# Patient Record
Sex: FEMALE | Race: WHITE | NOT HISPANIC OR LATINO | URBAN - METROPOLITAN AREA
[De-identification: names, ages, dates, MRNs, and addresses within clinical notes are randomized per-mention and may not be internally consistent; named-entity substitution may affect disease eponyms.]

---

## 2017-08-10 ENCOUNTER — FOLLOW UP ESTABLISHED (OUTPATIENT)
Dept: URBAN - METROPOLITAN AREA CLINIC 60 | Facility: CLINIC | Age: 82
End: 2017-08-10
Payer: MEDICARE

## 2017-08-10 DIAGNOSIS — H25.812 COMBINED FORMS OF AGE-RELATED CATARACT, LEFT EYE: Primary | ICD-10-CM

## 2017-08-10 DIAGNOSIS — H52.4 PRESBYOPIA: ICD-10-CM

## 2017-08-10 PROCEDURE — 92015 DETERMINE REFRACTIVE STATE: CPT | Performed by: OPTOMETRIST

## 2017-08-10 PROCEDURE — 92014 COMPRE OPH EXAM EST PT 1/>: CPT | Performed by: OPTOMETRIST

## 2017-08-10 ASSESSMENT — INTRAOCULAR PRESSURE
OD: 12
OS: 12

## 2017-08-10 ASSESSMENT — VISUAL ACUITY
OS: 20/50
OD: 20/40

## 2017-10-09 ENCOUNTER — NEW PATIENT (OUTPATIENT)
Dept: URBAN - METROPOLITAN AREA CLINIC 60 | Facility: CLINIC | Age: 82
End: 2017-10-09
Payer: MEDICARE

## 2017-10-09 DIAGNOSIS — H04.123 DRY EYE SYNDROME OF BILATERAL LACRIMAL GLANDS: ICD-10-CM

## 2017-10-09 DIAGNOSIS — H35.3131 NONEXUDATIVE MACULAR DEGENERATION, EARLY DRY STAGE, BILATERAL: ICD-10-CM

## 2017-10-09 DIAGNOSIS — H26.491 OTHER SECONDARY CATARACT, RIGHT EYE: ICD-10-CM

## 2017-10-09 PROCEDURE — 92004 COMPRE OPH EXAM NEW PT 1/>: CPT | Performed by: OPHTHALMOLOGY

## 2017-10-09 RX ORDER — OMEGA-3S/DHA/EPA/FISH OIL/D3 1150-1000
LIQUID (ML) ORAL
Qty: 0 | Refills: 0 | Status: ACTIVE
Start: 2017-10-09

## 2017-10-09 ASSESSMENT — VISUAL ACUITY
OS: 20/50
OD: 20/40

## 2017-10-09 ASSESSMENT — INTRAOCULAR PRESSURE
OS: 14
OD: 14

## 2017-10-17 ENCOUNTER — Encounter (OUTPATIENT)
Dept: URBAN - METROPOLITAN AREA CLINIC 60 | Facility: CLINIC | Age: 82
End: 2017-10-17
Payer: MEDICARE

## 2017-10-17 DIAGNOSIS — Z01.818 ENCOUNTER FOR OTHER PREPROCEDURAL EXAMINATION: Primary | ICD-10-CM

## 2017-10-17 PROCEDURE — 99213 OFFICE O/P EST LOW 20 MIN: CPT | Performed by: NURSE PRACTITIONER

## 2017-10-31 ENCOUNTER — SURGERY (OUTPATIENT)
Dept: URBAN - METROPOLITAN AREA SURGERY 36 | Facility: SURGERY | Age: 82
End: 2017-10-31
Payer: MEDICARE

## 2017-10-31 PROCEDURE — 66984 XCAPSL CTRC RMVL W/O ECP: CPT | Performed by: OPHTHALMOLOGY

## 2017-11-01 ENCOUNTER — POST OP (OUTPATIENT)
Dept: URBAN - METROPOLITAN AREA CLINIC 60 | Facility: CLINIC | Age: 82
End: 2017-11-01
Payer: MEDICARE

## 2017-11-01 PROCEDURE — 99024 POSTOP FOLLOW-UP VISIT: CPT | Performed by: OPTOMETRIST

## 2017-11-01 ASSESSMENT — INTRAOCULAR PRESSURE: OS: 16

## 2017-11-01 ASSESSMENT — VISUAL ACUITY: OD: 20/40

## 2017-11-08 ENCOUNTER — POST OP (OUTPATIENT)
Dept: URBAN - METROPOLITAN AREA CLINIC 60 | Facility: CLINIC | Age: 82
End: 2017-11-08
Payer: MEDICARE

## 2017-11-08 PROCEDURE — 99024 POSTOP FOLLOW-UP VISIT: CPT | Performed by: OPTOMETRIST

## 2017-11-08 ASSESSMENT — VISUAL ACUITY
OS: 20/25
OD: 20/25

## 2017-11-08 ASSESSMENT — INTRAOCULAR PRESSURE
OD: 14
OS: 15

## 2017-12-08 ENCOUNTER — POST OP (OUTPATIENT)
Dept: URBAN - METROPOLITAN AREA CLINIC 60 | Facility: CLINIC | Age: 82
End: 2017-12-08
Payer: MEDICARE

## 2017-12-08 PROCEDURE — 92015 DETERMINE REFRACTIVE STATE: CPT | Performed by: OPTOMETRIST

## 2017-12-08 PROCEDURE — 99024 POSTOP FOLLOW-UP VISIT: CPT | Performed by: OPTOMETRIST

## 2017-12-08 ASSESSMENT — INTRAOCULAR PRESSURE
OS: 13
OD: 13

## 2017-12-08 ASSESSMENT — VISUAL ACUITY
OD: 20/30
OS: 20/30

## 2018-12-13 ENCOUNTER — FOLLOW UP ESTABLISHED (OUTPATIENT)
Dept: URBAN - METROPOLITAN AREA CLINIC 60 | Facility: CLINIC | Age: 83
End: 2018-12-13
Payer: COMMERCIAL

## 2018-12-13 PROCEDURE — 92014 COMPRE OPH EXAM EST PT 1/>: CPT | Performed by: OPTOMETRIST

## 2018-12-13 ASSESSMENT — VISUAL ACUITY
OD: 20/25
OS: 20/25

## 2018-12-13 ASSESSMENT — INTRAOCULAR PRESSURE
OS: 14
OD: 13

## 2020-01-21 ENCOUNTER — FOLLOW UP ESTABLISHED (OUTPATIENT)
Dept: URBAN - METROPOLITAN AREA CLINIC 60 | Facility: CLINIC | Age: 85
End: 2020-01-21
Payer: COMMERCIAL

## 2020-01-21 DIAGNOSIS — H26.493 OTHER SECONDARY CATARACT, BILATERAL: ICD-10-CM

## 2020-01-21 PROCEDURE — 92250 FUNDUS PHOTOGRAPHY W/I&R: CPT | Performed by: OPTOMETRIST

## 2020-01-21 PROCEDURE — 92014 COMPRE OPH EXAM EST PT 1/>: CPT | Performed by: OPTOMETRIST

## 2020-01-21 ASSESSMENT — INTRAOCULAR PRESSURE
OS: 11
OD: 11

## 2021-01-27 ENCOUNTER — FOLLOW UP ESTABLISHED (OUTPATIENT)
Dept: URBAN - METROPOLITAN AREA CLINIC 60 | Facility: CLINIC | Age: 86
End: 2021-01-27
Payer: COMMERCIAL

## 2021-01-27 DIAGNOSIS — H43.813 VITREOUS DEGENERATION, BILATERAL: ICD-10-CM

## 2021-01-27 DIAGNOSIS — H35.3132 NONEXUDATIVE MACULAR DEGENERATION, INTERMEDIATE DRY STAGE, BILATERAL: Primary | ICD-10-CM

## 2021-01-27 PROCEDURE — 92014 COMPRE OPH EXAM EST PT 1/>: CPT | Performed by: OPTOMETRIST

## 2021-01-27 PROCEDURE — 92250 FUNDUS PHOTOGRAPHY W/I&R: CPT | Performed by: OPTOMETRIST

## 2021-01-27 RX ORDER — VIT C/E/ZN/COPPR/LUTEIN/ZEAXAN 250MG-90MG
CAPSULE ORAL
Qty: 0 | Refills: 0 | Status: ACTIVE
Start: 2021-01-27

## 2021-01-27 ASSESSMENT — INTRAOCULAR PRESSURE
OD: 14
OS: 12

## 2021-01-27 ASSESSMENT — VISUAL ACUITY
OS: 20/40
OD: 20/30

## 2021-11-09 ENCOUNTER — OFFICE VISIT (OUTPATIENT)
Dept: URBAN - METROPOLITAN AREA CLINIC 60 | Facility: CLINIC | Age: 86
End: 2021-11-09
Payer: COMMERCIAL

## 2021-11-09 DIAGNOSIS — H00.012 HORDEOLUM EXTERNUM RIGHT LOWER EYELID: Primary | ICD-10-CM

## 2021-11-09 PROCEDURE — 92012 INTRM OPH EXAM EST PATIENT: CPT | Performed by: OPTOMETRIST

## 2021-11-09 RX ORDER — CEPHALEXIN 500 MG/1
500 MG CAPSULE ORAL
Qty: 21 | Refills: 0 | Status: ACTIVE
Start: 2021-11-09

## 2021-11-09 NOTE — IMPRESSION/PLAN
Impression: Hordeolum externum right lower eyelid: H00.012. Plan: Patient educated on findings. Recommend patient perform  warm compresses. Will start patient on Cephalexin TID PO (500mg, 21 capsules), erx'd to patient's pharmacy.  If symptoms should worsen or do not improve, patient to call office and will scheduled with Dr. Johan Lopez for possible removal.

## 2022-02-02 ENCOUNTER — OFFICE VISIT (OUTPATIENT)
Dept: URBAN - METROPOLITAN AREA CLINIC 60 | Facility: CLINIC | Age: 87
End: 2022-02-02
Payer: COMMERCIAL

## 2022-02-02 DIAGNOSIS — H16.223 KERATOCONJUNCTIVITIS SICCA, BILATERAL: ICD-10-CM

## 2022-02-02 DIAGNOSIS — H35.3121 NEXDTVE AGE-RELATED MCLR DEGN, LEFT EYE, EARLY DRY STAGE: ICD-10-CM

## 2022-02-02 DIAGNOSIS — H35.3112 NEXDTVE AGE-RELATED MCLR DEGN, RIGHT EYE, INTERMED DRY STAGE: Primary | ICD-10-CM

## 2022-02-02 DIAGNOSIS — Z96.1 PRESENCE OF INTRAOCULAR LENS: ICD-10-CM

## 2022-02-02 PROCEDURE — 92014 COMPRE OPH EXAM EST PT 1/>: CPT | Performed by: OPTOMETRIST

## 2022-02-02 PROCEDURE — 92134 CPTRZ OPH DX IMG PST SGM RTA: CPT | Performed by: OPTOMETRIST

## 2022-02-02 ASSESSMENT — VISUAL ACUITY
OD: 20/40
OS: 20/30

## 2022-02-02 ASSESSMENT — INTRAOCULAR PRESSURE
OS: 15
OD: 15

## 2022-02-02 NOTE — IMPRESSION/PLAN
Impression: Keratoconjunctivitis sicca, bilateral: K90.483. Plan: Diagnosis discussed with patient. Recommend patient use artificial tears as needed.

## 2022-02-02 NOTE — IMPRESSION/PLAN
Impression: Nexdtve age-related mclr degn, right eye, intermed dry stage: H35.3112. Plan: Patient was educated on today's findings and recommend yearly exams. Reviewed last testing done. Baseline OCT(MAC) done today to document any changes for future visits. Recommend patient continue taking an AREDS-2 formula multiple vitamin daily.

## 2023-02-02 ENCOUNTER — OFFICE VISIT (OUTPATIENT)
Dept: URBAN - METROPOLITAN AREA CLINIC 60 | Facility: CLINIC | Age: 88
End: 2023-02-02
Payer: COMMERCIAL

## 2023-02-02 DIAGNOSIS — H35.3121 NEXDTVE AGE-RELATED MCLR DEGN, LEFT EYE, EARLY DRY STAGE: ICD-10-CM

## 2023-02-02 DIAGNOSIS — H40.013 OPEN ANGLE WITH BORDERLINE FINDINGS, LOW RISK, BILATERAL: ICD-10-CM

## 2023-02-02 DIAGNOSIS — H35.3112 NEXDTVE AGE-RELATED MCLR DEGN, RIGHT EYE, INTERMED DRY STAGE: Primary | ICD-10-CM

## 2023-02-02 DIAGNOSIS — Z96.1 PRESENCE OF INTRAOCULAR LENS: ICD-10-CM

## 2023-02-02 PROCEDURE — 92133 CPTRZD OPH DX IMG PST SGM ON: CPT | Performed by: OPTOMETRIST

## 2023-02-02 PROCEDURE — 92014 COMPRE OPH EXAM EST PT 1/>: CPT | Performed by: OPTOMETRIST

## 2023-02-02 ASSESSMENT — INTRAOCULAR PRESSURE
OD: 13
OS: 14

## 2024-04-26 NOTE — IMPRESSION/PLAN
Impression: Nexdtve age-related mclr degn, left eye, early dry stage: H35.3121. Plan: See plan #1.
Impression: Nexdtve age-related mclr degn, right eye, intermed dry stage: H35.3112. Plan: Patient was educated on today's findings and recommend yearly exams. Reviewed last testing done. Recommend patient continue taking an AREDS-2 formula multiple vitamin daily.
Impression: Open angle with borderline findings, low risk, bilateral: H40.013. *Secondary to drance heme OS* Plan: Patient educated on findings. Baseline OCT(RNFL) done today to document any changes for future visits. No need for drop treatment at this time.
Impression: Presence of intraocular lens: Z96.1. Plan: Well positioned IOL(s).
Never

## 2024-10-07 ENCOUNTER — OFFICE VISIT (OUTPATIENT)
Dept: URBAN - METROPOLITAN AREA CLINIC 60 | Facility: CLINIC | Age: 89
End: 2024-10-07
Payer: COMMERCIAL

## 2024-10-07 DIAGNOSIS — Z96.1 PRESENCE OF INTRAOCULAR LENS: ICD-10-CM

## 2024-10-07 DIAGNOSIS — H43.813 VITREOUS DEGENERATION, BILATERAL: ICD-10-CM

## 2024-10-07 DIAGNOSIS — H47.022 HEMORRHAGE IN OPTIC NERVE SHEATH, LEFT EYE: ICD-10-CM

## 2024-10-07 DIAGNOSIS — H26.492 OTHER SECONDARY CATARACT, LEFT EYE: ICD-10-CM

## 2024-10-07 DIAGNOSIS — H40.013 OPEN ANGLE WITH BORDERLINE FINDINGS, LOW RISK, BILATERAL: ICD-10-CM

## 2024-10-07 DIAGNOSIS — H35.3133 BILATERAL NONEXUDATIVE AGE-RELATED MACULAR DEGENERATION, ADVANCED ATROPHIC W/O SUBFOVEAL INVOLVEMENT: Primary | ICD-10-CM

## 2024-10-07 PROCEDURE — 92014 COMPRE OPH EXAM EST PT 1/>: CPT | Performed by: OPTOMETRIST

## 2024-10-07 PROCEDURE — 92134 CPTRZ OPH DX IMG PST SGM RTA: CPT | Performed by: OPTOMETRIST

## 2024-10-07 ASSESSMENT — VISUAL ACUITY
OS: 20/30
OD: 20/50

## 2024-10-07 ASSESSMENT — INTRAOCULAR PRESSURE
OS: 14
OD: 14